# Patient Record
(demographics unavailable — no encounter records)

---

## 2025-01-06 NOTE — PHYSICAL EXAM

## 2025-01-08 NOTE — HISTORY OF PRESENT ILLNESS
[Father] : father [Normal] : Normal [In bed] : In bed [Brushing teeth] : Brushing teeth [Toothpaste] : Primary Fluoride Source: Toothpaste [In nursery school] : In nursery school [Grains] : grains [Dairy] : dairy [Yes] : Cigarette smoke exposure [COVID-19] : COVID-19 [FreeTextEntry7] : diagnsoed with RSV last month  [de-identified] : apples and carrots  [FreeTextEntry3] : nap at  only

## 2025-01-08 NOTE — HISTORY OF PRESENT ILLNESS
[Father] : father [Normal] : Normal [In bed] : In bed [Brushing teeth] : Brushing teeth [Toothpaste] : Primary Fluoride Source: Toothpaste [In nursery school] : In nursery school [Grains] : grains [Dairy] : dairy [Yes] : Cigarette smoke exposure [COVID-19] : COVID-19 [FreeTextEntry7] : diagnsoed with RSV last month  [de-identified] : apples and carrots  [FreeTextEntry3] : nap at  only

## 2025-01-08 NOTE — DEVELOPMENTAL MILESTONES
[Plays and shares with others] : plays and shares with others [Eats independently] : eats independently [Uses 3-word sentences] : uses 3-word sentences [Climbs on and off couch] : climbs on and off couch or chair [Jumps forward] : jumps forward [Cuts with child scissor] : cuts with child scissor [Goes to the bathroom and urinates] : does not go to bathroom and urinates by self [FreeTextEntry1] : not interested in potty training  concerns with articulation has apeech thru his insurance